# Patient Record
Sex: FEMALE | Race: WHITE | Employment: OTHER | URBAN - METROPOLITAN AREA
[De-identification: names, ages, dates, MRNs, and addresses within clinical notes are randomized per-mention and may not be internally consistent; named-entity substitution may affect disease eponyms.]

---

## 2021-02-19 ENCOUNTER — OFFICE VISIT (OUTPATIENT)
Dept: ENT CLINIC | Age: 76
End: 2021-02-19
Payer: MEDICARE

## 2021-02-19 VITALS
BODY MASS INDEX: 26.01 KG/M2 | SYSTOLIC BLOOD PRESSURE: 114 MMHG | HEIGHT: 68 IN | TEMPERATURE: 97.2 F | WEIGHT: 171.6 LBS | HEART RATE: 74 BPM | DIASTOLIC BLOOD PRESSURE: 73 MMHG

## 2021-02-19 DIAGNOSIS — M54.2 NECK PAIN ON LEFT SIDE: Primary | ICD-10-CM

## 2021-02-19 PROCEDURE — G8419 CALC BMI OUT NRM PARAM NOF/U: HCPCS | Performed by: OTOLARYNGOLOGY

## 2021-02-19 PROCEDURE — 3017F COLORECTAL CA SCREEN DOC REV: CPT | Performed by: OTOLARYNGOLOGY

## 2021-02-19 PROCEDURE — 1090F PRES/ABSN URINE INCON ASSESS: CPT | Performed by: OTOLARYNGOLOGY

## 2021-02-19 PROCEDURE — 1036F TOBACCO NON-USER: CPT | Performed by: OTOLARYNGOLOGY

## 2021-02-19 PROCEDURE — 1123F ACP DISCUSS/DSCN MKR DOCD: CPT | Performed by: OTOLARYNGOLOGY

## 2021-02-19 PROCEDURE — G8484 FLU IMMUNIZE NO ADMIN: HCPCS | Performed by: OTOLARYNGOLOGY

## 2021-02-19 PROCEDURE — 4040F PNEUMOC VAC/ADMIN/RCVD: CPT | Performed by: OTOLARYNGOLOGY

## 2021-02-19 PROCEDURE — G8427 DOCREV CUR MEDS BY ELIG CLIN: HCPCS | Performed by: OTOLARYNGOLOGY

## 2021-02-19 PROCEDURE — G8400 PT W/DXA NO RESULTS DOC: HCPCS | Performed by: OTOLARYNGOLOGY

## 2021-02-19 PROCEDURE — 99203 OFFICE O/P NEW LOW 30 MIN: CPT | Performed by: OTOLARYNGOLOGY

## 2021-02-19 RX ORDER — LEVOTHYROXINE SODIUM 88 UG/1
88 TABLET ORAL DAILY
COMMUNITY

## 2021-02-19 SDOH — HEALTH STABILITY: MENTAL HEALTH: HOW MANY STANDARD DRINKS CONTAINING ALCOHOL DO YOU HAVE ON A TYPICAL DAY?: NOT ASKED

## 2021-02-19 ASSESSMENT — ENCOUNTER SYMPTOMS
SHORTNESS OF BREATH: 0
COUGH: 0
FACIAL SWELLING: 0
VOICE CHANGE: 0
SORE THROAT: 0
EYE ITCHING: 0
APNEA: 0
SINUS PRESSURE: 0
TROUBLE SWALLOWING: 0

## 2021-02-19 NOTE — PROGRESS NOTES
[address], [address], [address] [place] [address]  P: [phone]       Patient     Raul Amezcua  1945    ChiefComplaint     Chief Complaint   Patient presents with    Other     States she has tendorness on the left side under her jaw, states it has been batool on since Monday. States she gets pain when she swallows (external and adjacent to esophague) turning head causes pain. History of Present Illness     Fredo Cummings is 81-year-old female here today for evaluation of left anterior neck pain. Reports waking up earlier this week with mild left anterior neck pain. Thought maybe it was swollen glands. Was then evaluated weighted by PCP who recommended she be evaluated by dentist that she was recently being treated for cavity. Dentist evaluated her and did not find any concerning lesions on the teeth. Reports intermittent mild left anterior neck pain. No obvious swelling. Area does not become more painful with eating. Denies fever, chills, nausea, vomiting. Pain is reproducible with palpation.     Past Medical History     Past Medical History:   Diagnosis Date    Allergic rhinitis     GERD (gastroesophageal reflux disease)     Tinnitus        Past Surgical History     Past Surgical History:   Procedure Laterality Date    TONSILLECTOMY         Family History     Family History   Problem Relation Age of Onset    Hypertension Mother     Hypertension Maternal Grandmother     Glaucoma Maternal Grandfather     Glaucoma Paternal Grandfather        Social History     Social History     Tobacco Use    Smoking status: Never Smoker    Smokeless tobacco: Never Used    Tobacco comment: used to only socially smoke 50 years ago   Substance Use Topics    Alcohol use: Yes     Comment: occas wine    Drug use: Not on file        Allergies     No Known Allergies    Medications     Current Outpatient Medications   Medication Sig Dispense Refill  levothyroxine (SYNTHROID) 88 MCG tablet Take 88 mcg by mouth Daily       No current facility-administered medications for this visit. Review of Systems     Review of Systems   Constitutional: Negative for appetite change, chills, fatigue, fever and unexpected weight change. HENT: Negative for congestion, ear discharge, ear pain, facial swelling, hearing loss, nosebleeds, postnasal drip, sinus pressure, sneezing, sore throat, tinnitus, trouble swallowing and voice change. Eyes: Negative for itching. Respiratory: Negative for apnea, cough and shortness of breath. Gastrointestinal:        Negative for dysphasia   Endocrine: Negative for cold intolerance and heat intolerance. Musculoskeletal: Positive for neck pain. Negative for myalgias. Skin: Negative for rash. Allergic/Immunologic: Negative for environmental allergies. Neurological: Negative for dizziness and headaches. Psychiatric/Behavioral: Negative for confusion, decreased concentration and sleep disturbance. PhysicalExam     Vitals:    02/19/21 1306   BP: 114/73   Site: Right Upper Arm   Position: Sitting   Cuff Size: Medium Adult   Pulse: 74   Temp: 97.2 °F (36.2 °C)   TempSrc: Infrared   Weight: 171 lb 9.6 oz (77.8 kg)   Height: 5' 8\" (1.727 m)       Physical Exam  Constitutional:       General: She is not in acute distress. Appearance: She is well-developed. HENT:      Head: Normocephalic and atraumatic. Salivary Glands: Right salivary gland is not diffusely enlarged or tender. Left salivary gland is not diffusely enlarged or tender. Comments: Clear saliva from bilateral seth's ducts     Right Ear: Tympanic membrane, ear canal and external ear normal. No drainage. No middle ear effusion. Tympanic membrane is not bulging. Tympanic membrane has normal mobility. Left Ear: Tympanic membrane, ear canal and external ear normal. No drainage. No middle ear effusion. Tympanic membrane is not bulging. Tympanic membrane has normal mobility. Nose: No septal deviation, mucosal edema or rhinorrhea. Mouth/Throat:      Lips: Pink. Mouth: Mucous membranes are moist.      Tongue: No lesions. Palate: No mass. Pharynx: Uvula midline. Comments: Mirror exam was performed. The nasopharynx, larynx,or hypopharynx were examined. Vocal fold motion was examined. Pertinent findings include: normal exam- no mass or lesion    Eyes:      Pupils: Pupils are equal, round, and reactive to light. Neck:      Musculoskeletal: Full passive range of motion without pain. Thyroid: No thyroid mass or thyromegaly. Trachea: Trachea and phonation normal.     Cardiovascular:      Pulses: Normal pulses. Pulmonary:      Effort: Pulmonary effort is normal. No accessory muscle usage or respiratory distress. Breath sounds: No stridor. Lymphadenopathy:      Head:      Right side of head: No submental or submandibular adenopathy. Left side of head: No submental or submandibular adenopathy. Cervical: No cervical adenopathy. Right cervical: No superficial, deep or posterior cervical adenopathy. Left cervical: No superficial, deep or posterior cervical adenopathy. Skin:     General: Skin is warm and dry. Neurological:      Mental Status: She is alert and oriented to person, place, and time. Cranial Nerves: No cranial nerve deficit. Coordination: Coordination normal.      Gait: Gait normal.   Psychiatric:         Thought Content: Thought content normal.             Assessment and Plan     1.  Neck pain on left side  -Examination consistent with hyodynia  -Recommend warm compresses and NSAIDs also discussed steroid injection  -Patient reports symptoms is mild and wishes for observation at this time She will return should I think worsen or change. Cinda Dorsey, DO  2/19/21      Portions of this note were dictated using Dragon.  There may be linguistic errors secondary to the use of this program.